# Patient Record
(demographics unavailable — no encounter records)

---

## 2024-10-18 NOTE — PHYSICAL EXAM
[General Appearance - Well Developed] : well developed [Normal Oral Mucosa] : normal oral mucosa [Heart Rate And Rhythm] : heart rate and rhythm were normal [Heart Sounds] : normal S1 and S2 [] : no respiratory distress [Bowel Sounds] : normal bowel sounds [Abnormal Walk] : normal gait [Skin Color & Pigmentation] : normal skin color and pigmentation [Oriented To Time, Place, And Person] : oriented to person, place, and time

## 2024-10-18 NOTE — REASON FOR VISIT
[Initial Evaluation] : an initial evaluation of [FreeTextEntry2] : F/u post hospital admission  [FreeTextEntry1] : 46 y/o M with h/o kidney stones, very active with no restrictions, no family h/o of CAD. Pt presents today post recent ER visit with c/o CP. Reports Chest pain for few days with numbness of the left arm, no exertional lasting few minutes. negative w/u in ER. Pt presents for initial evaluation.

## 2024-10-18 NOTE — ASSESSMENT
[FreeTextEntry1] : 46 y/o M with h/o kidney stones, very active with no restrictions,  no family h/o of CAD.  recent ER visit with c/o CP negative w/u in ER       Plan: - Full labs  - Echo  - Stress Echo

## 2024-11-20 NOTE — HISTORY OF PRESENT ILLNESS
[FreeTextEntry1] : Patient is a 46-year-old male, with pmhx of kidney stones, presenting today for stress echo. Last seen in office on 10/18/24 for hospital follow up. Patient was evaluated in ER with complaints of chest pain with radiation to and numbness of the left arm. Work up for ACS was negative at that time.   Labs from 11/2024 were reviewed: HDL 43  Triglycerides 386 HgbA1c 5.7 ApoLpB 125

## 2024-11-20 NOTE — DISCUSSION/SUMMARY
[FreeTextEntry1] : Stress echo performed today revealed no evidence of exercise-induced ischemia at 85% MPHR.  Hyperlipidemia: Weight loss with diet modification and increased exercise recommended. Will follow up lab work with PCP.   CACS pending.   Follow up results.  Patient aware of plan.